# Patient Record
Sex: FEMALE | Race: WHITE | NOT HISPANIC OR LATINO | Employment: FULL TIME | URBAN - METROPOLITAN AREA
[De-identification: names, ages, dates, MRNs, and addresses within clinical notes are randomized per-mention and may not be internally consistent; named-entity substitution may affect disease eponyms.]

---

## 2023-08-28 LAB
ALANINE AMINOTRANSFERASE (SGPT) (U/L) IN SER/PLAS: 19 U/L (ref 7–45)
ALBUMIN (G/DL) IN SER/PLAS: 4.6 G/DL (ref 3.4–5)
ALKALINE PHOSPHATASE (U/L) IN SER/PLAS: 63 U/L (ref 33–110)
ANION GAP IN SER/PLAS: 14 MMOL/L (ref 10–20)
APPEARANCE, URINE: CLEAR
ASPARTATE AMINOTRANSFERASE (SGOT) (U/L) IN SER/PLAS: 26 U/L (ref 9–39)
BILIRUBIN TOTAL (MG/DL) IN SER/PLAS: 0.6 MG/DL (ref 0–1.2)
BILIRUBIN, URINE: NEGATIVE
BLOOD, URINE: NEGATIVE
CALCIUM (MG/DL) IN SER/PLAS: 9.9 MG/DL (ref 8.6–10.6)
CARBON DIOXIDE, TOTAL (MMOL/L) IN SER/PLAS: 27 MMOL/L (ref 21–32)
CHLORIDE (MMOL/L) IN SER/PLAS: 101 MMOL/L (ref 98–107)
CHOLESTEROL (MG/DL) IN SER/PLAS: 237 MG/DL (ref 0–199)
CHOLESTEROL IN HDL (MG/DL) IN SER/PLAS: 76.6 MG/DL
CHOLESTEROL/HDL RATIO: 3.1
COLOR, URINE: YELLOW
CREATININE (MG/DL) IN SER/PLAS: 1.01 MG/DL (ref 0.5–1.05)
ERYTHROCYTE DISTRIBUTION WIDTH (RATIO) BY AUTOMATED COUNT: 12.7 % (ref 11.5–14.5)
ERYTHROCYTE MEAN CORPUSCULAR HEMOGLOBIN CONCENTRATION (G/DL) BY AUTOMATED: 34 G/DL (ref 32–36)
ERYTHROCYTE MEAN CORPUSCULAR VOLUME (FL) BY AUTOMATED COUNT: 96 FL (ref 80–100)
ERYTHROCYTES (10*6/UL) IN BLOOD BY AUTOMATED COUNT: 4.4 X10E12/L (ref 4–5.2)
ESTIMATED AVERAGE GLUCOSE FOR HBA1C: 80 MG/DL
GFR FEMALE: 64 ML/MIN/1.73M2
GLUCOSE (MG/DL) IN SER/PLAS: 89 MG/DL (ref 74–99)
GLUCOSE, URINE: NEGATIVE MG/DL
HEMATOCRIT (%) IN BLOOD BY AUTOMATED COUNT: 42.3 % (ref 36–46)
HEMOGLOBIN (G/DL) IN BLOOD: 14.4 G/DL (ref 12–16)
HEMOGLOBIN A1C/HEMOGLOBIN TOTAL IN BLOOD: 4.4 %
KETONES, URINE: NEGATIVE MG/DL
LDL: 136 MG/DL (ref 0–99)
LEUKOCYTE ESTERASE, URINE: NEGATIVE
LEUKOCYTES (10*3/UL) IN BLOOD BY AUTOMATED COUNT: 6.4 X10E9/L (ref 4.4–11.3)
NITRITE, URINE: NEGATIVE
NRBC (PER 100 WBCS) BY AUTOMATED COUNT: 0 /100 WBC (ref 0–0)
PH, URINE: 6 (ref 5–8)
PLATELETS (10*3/UL) IN BLOOD AUTOMATED COUNT: 241 X10E9/L (ref 150–450)
POTASSIUM (MMOL/L) IN SER/PLAS: 4.8 MMOL/L (ref 3.5–5.3)
PROTEIN TOTAL: 7.1 G/DL (ref 6.4–8.2)
PROTEIN, URINE: NEGATIVE MG/DL
SODIUM (MMOL/L) IN SER/PLAS: 137 MMOL/L (ref 136–145)
SPECIFIC GRAVITY, URINE: 1.01 (ref 1–1.03)
THYROTROPIN (MIU/L) IN SER/PLAS BY DETECTION LIMIT <= 0.05 MIU/L: 78.17 MIU/L (ref 0.44–3.98)
THYROXINE (T4) FREE (NG/DL) IN SER/PLAS: 0.92 NG/DL (ref 0.78–1.48)
TRIGLYCERIDE (MG/DL) IN SER/PLAS: 120 MG/DL (ref 0–149)
UREA NITROGEN (MG/DL) IN SER/PLAS: 26 MG/DL (ref 6–23)
UROBILINOGEN, URINE: 4 MG/DL (ref 0–1.9)
VLDL: 24 MG/DL (ref 0–40)

## 2023-11-30 DIAGNOSIS — G89.29 CHRONIC LOW BACK PAIN, UNSPECIFIED BACK PAIN LATERALITY, UNSPECIFIED WHETHER SCIATICA PRESENT: Primary | ICD-10-CM

## 2023-11-30 DIAGNOSIS — M54.50 CHRONIC LOW BACK PAIN, UNSPECIFIED BACK PAIN LATERALITY, UNSPECIFIED WHETHER SCIATICA PRESENT: Primary | ICD-10-CM

## 2023-11-30 PROBLEM — N63.10 BREAST MASS, RIGHT: Status: ACTIVE | Noted: 2023-11-30

## 2023-11-30 PROBLEM — E03.9 HYPOTHYROIDISM: Status: ACTIVE | Noted: 2023-11-30

## 2023-11-30 PROBLEM — R05.9 COUGH: Status: ACTIVE | Noted: 2023-11-30

## 2023-11-30 PROBLEM — J18.9 PNEUMONIA: Status: RESOLVED | Noted: 2023-11-30 | Resolved: 2023-11-30

## 2023-11-30 PROBLEM — R10.13 ABDOMINAL PAIN, EPIGASTRIC: Status: RESOLVED | Noted: 2023-11-30 | Resolved: 2023-11-30

## 2023-11-30 PROBLEM — K59.00 CONSTIPATION: Status: ACTIVE | Noted: 2023-11-30

## 2023-11-30 PROBLEM — R10.9 ABDOMINAL PAIN: Status: RESOLVED | Noted: 2023-11-30 | Resolved: 2023-11-30

## 2023-11-30 PROBLEM — D72.819 LEUKOPENIA: Status: ACTIVE | Noted: 2023-11-30

## 2023-11-30 PROBLEM — F90.2 ADHD (ATTENTION DEFICIT HYPERACTIVITY DISORDER), COMBINED TYPE: Status: ACTIVE | Noted: 2023-11-30

## 2023-11-30 PROBLEM — L82.0 INFLAMED SEBORRHEIC KERATOSIS: Status: ACTIVE | Noted: 2022-05-05

## 2023-11-30 PROBLEM — K62.3 RECTAL PROLAPSE: Status: ACTIVE | Noted: 2023-11-30

## 2023-11-30 PROBLEM — R06.00 DYSPNEA: Status: ACTIVE | Noted: 2023-11-30

## 2023-11-30 PROBLEM — M54.2 NECK PAIN: Status: ACTIVE | Noted: 2023-11-30

## 2023-11-30 PROBLEM — F41.8 DEPRESSION WITH ANXIETY: Status: ACTIVE | Noted: 2023-11-30

## 2023-11-30 PROBLEM — E78.5 HYPERLIPEMIA: Status: ACTIVE | Noted: 2023-11-30

## 2023-11-30 PROBLEM — C44.519 BASAL CELL CARCINOMA OF SKIN OF OTHER PART OF TRUNK: Status: ACTIVE | Noted: 2022-05-05

## 2023-11-30 PROBLEM — K62.5 RECTAL BLEEDING: Status: RESOLVED | Noted: 2023-11-30 | Resolved: 2023-11-30

## 2023-11-30 PROBLEM — L57.0 ACTINIC KERATOSIS: Status: ACTIVE | Noted: 2022-05-05

## 2023-11-30 PROBLEM — K21.9 GERD (GASTROESOPHAGEAL REFLUX DISEASE): Status: ACTIVE | Noted: 2023-11-30

## 2023-11-30 PROBLEM — I95.1 DELAYED ORTHOSTATIC HYPOTENSION: Status: ACTIVE | Noted: 2023-11-30

## 2023-11-30 PROBLEM — N39.0 UTI (URINARY TRACT INFECTION): Status: RESOLVED | Noted: 2023-11-30 | Resolved: 2023-11-30

## 2023-11-30 PROBLEM — S20.219A RIB CONTUSION: Status: RESOLVED | Noted: 2023-11-30 | Resolved: 2023-11-30

## 2023-11-30 PROBLEM — K62.89 RECTAL PAIN: Status: RESOLVED | Noted: 2023-11-30 | Resolved: 2023-11-30

## 2023-11-30 PROBLEM — R92.8 ABNORMAL MAMMOGRAM: Status: ACTIVE | Noted: 2023-11-30

## 2023-11-30 RX ORDER — ETODOLAC 500 MG/1
500 TABLET, FILM COATED ORAL 2 TIMES DAILY
Qty: 180 TABLET | Refills: 0 | Status: SHIPPED | OUTPATIENT
Start: 2023-11-30 | End: 2024-02-28

## 2023-11-30 RX ORDER — TRAZODONE HYDROCHLORIDE 50 MG/1
TABLET ORAL
COMMUNITY
Start: 2014-02-24

## 2023-11-30 RX ORDER — ONDANSETRON 8 MG/1
TABLET, ORALLY DISINTEGRATING ORAL
COMMUNITY
Start: 2023-03-03

## 2023-11-30 RX ORDER — FLUOXETINE HYDROCHLORIDE 40 MG/1
2 CAPSULE ORAL DAILY
COMMUNITY
Start: 2014-02-17

## 2023-11-30 RX ORDER — PANTOPRAZOLE SODIUM 40 MG/1
TABLET, DELAYED RELEASE ORAL
COMMUNITY
Start: 2019-09-20 | End: 2024-03-11 | Stop reason: SDUPTHER

## 2023-11-30 RX ORDER — DEXTROAMPHETAMINE SULFATE, DEXTROAMPHETAMINE SACCHARATE, AMPHETAMINE ASPARTATE MONOHYDRATE, AND AMPHETAMINE SULFATE 12.5; 12.5; 12.5; 12.5 MG/1; MG/1; MG/1; MG/1
CAPSULE, EXTENDED RELEASE ORAL
COMMUNITY
Start: 2018-04-24

## 2023-11-30 RX ORDER — LEVOTHYROXINE SODIUM 125 UG/1
1 TABLET ORAL DAILY
COMMUNITY
Start: 2013-11-20

## 2023-11-30 RX ORDER — ATORVASTATIN CALCIUM 40 MG/1
1 TABLET, FILM COATED ORAL DAILY
COMMUNITY
Start: 2019-08-21 | End: 2024-01-30

## 2023-11-30 RX ORDER — LINACLOTIDE 145 UG/1
CAPSULE, GELATIN COATED ORAL
COMMUNITY
Start: 2019-09-20 | End: 2024-03-11 | Stop reason: SDUPTHER

## 2023-11-30 RX ORDER — ETODOLAC 500 MG/1
1 TABLET, FILM COATED ORAL 2 TIMES DAILY
COMMUNITY
Start: 2014-11-11 | End: 2023-11-30 | Stop reason: SDUPTHER

## 2024-01-03 ENCOUNTER — APPOINTMENT (OUTPATIENT)
Dept: RHEUMATOLOGY | Facility: CLINIC | Age: 59
End: 2024-01-03
Payer: COMMERCIAL

## 2024-02-20 ENCOUNTER — PROCEDURE VISIT (OUTPATIENT)
Dept: DERMATOLOGY | Facility: CLINIC | Age: 59
End: 2024-02-20
Payer: COMMERCIAL

## 2024-02-20 DIAGNOSIS — L20.89 OTHER ATOPIC DERMATITIS: Primary | ICD-10-CM

## 2024-02-20 DIAGNOSIS — D17.22 LIPOMA OF LEFT UPPER EXTREMITY: ICD-10-CM

## 2024-02-20 PROCEDURE — 13121 CMPLX RPR S/A/L 2.6-7.5 CM: CPT | Performed by: SPECIALIST

## 2024-02-20 PROCEDURE — 88304 TISSUE EXAM BY PATHOLOGIST: CPT | Performed by: DERMATOLOGY

## 2024-02-20 PROCEDURE — 11406 EXC TR-EXT B9+MARG >4.0 CM: CPT | Performed by: SPECIALIST

## 2024-02-20 RX ORDER — MUPIROCIN 20 MG/G
OINTMENT TOPICAL
Qty: 15 G | Refills: 0 | Status: SHIPPED | OUTPATIENT
Start: 2024-02-20

## 2024-02-20 RX ORDER — MOMETASONE FUROATE 1 MG/G
CREAM TOPICAL
Qty: 45 G | Refills: 0 | Status: SHIPPED | OUTPATIENT
Start: 2024-02-20

## 2024-02-20 RX ORDER — DOXYCYCLINE 100 MG/1
CAPSULE ORAL
Qty: 8 CAPSULE | Refills: 0 | Status: SHIPPED | OUTPATIENT
Start: 2024-02-20

## 2024-02-20 NOTE — PROGRESS NOTES
Subjective   HPI: Yoon Vazquez is a 58 y.o. female is here for evaluation and treatment of a painful enlarging bump on my left shoulder..     ROS: No other skin or systemic complaints other than what is documented elsewhere in the note.    ALLERGIES: Patient has no known allergies.    SOCIAL:  has no history on file for tobacco use, alcohol use, and drug use.    Objective     Description: This is a somewhat painful and enlarging large palpable soft subcutaneous mass involving her left shoulder.    Assessment/Plan      Plan: Sterile surgical excision.    FOLLOW UP: 10 days for suture removal.    The patient was encouraged to contact me with any further questions or concerns.  Randolph Lozano MD  2/20/2024

## 2024-02-20 NOTE — LETTER
February 20, 2024     Patient: Yoon Vazquez   YOB: 1965   Date of Visit: 2/20/2024       To Whom It May Concern:    It is my medical opinion that Yoon Vazquez may return to work on 2/27/24  with light duty of no heavy lifting or straining for another 7 days after.    If you have any questions or concerns, please don't hesitate to call.         Sincerely,        Randolph Lozano MD    CC: No Recipients

## 2024-02-20 NOTE — PROGRESS NOTES
Subjective     Yoon Vazquez is a 58 y.o. female who presents for the following: Sterile Surgery (Lipoma - Left Shoulder ).     Review of Systems:  No other skin or systemic complaints other than what is documented elsewhere in the note.    The following portions of the chart were reviewed this encounter and updated as appropriate:          Skin Cancer History  No skin cancer on file.      Specialty Problems          Dermatology Problems    Actinic keratosis    Basal cell carcinoma of skin of other part of trunk    Inflamed seborrheic keratosis        Objective   Well appearing patient in no apparent distress; mood and affect are within normal limits.    A focused skin examination was performed. All findings within normal limits unless otherwise noted below.    Assessment/Plan   1. Other atopic dermatitis    Related Medications  mometasone (Elocon) 0.1 % cream  Apply topically once daily to face.    2. Lipoma of neck  Neck - Posterior    Skin excision - Neck - Posterior    Informed consent: discussed and consent obtained    Timeout: patient name, date of birth, surgical site, and procedure verified    Procedure prep:  Patient prepped in sterile fashion  Anesthesia: the lesion was anesthetized in a standard fashion    Anesthetic:  1% lidocaine w/ epinephrine 1-100,000 local infiltration  Instrument used: #15 blade    Punch size:  4mm  Hemostasis achieved with: suture and pressure    Outcome: patient tolerated procedure well with no complications    Post-procedure details: sterile dressing applied and wound care instructions given    Dressing type: bandage      Specimen 1 - Dermatopathology- DERM LAB  Differential Diagnosis: Lipoma   Check Margins Yes/No?:    Comments:    Dermpath Lab: Routine Histopathology (formalin-fixed tissue)    Related Medications  doxycycline (Vibramycin) 100 mg capsule  Take 2 capsules PO today and then 1 capsule PO every day x6 days. Take with at least 8 ounces (large glass) of water, do not  lie down for 30 minutes after.    mupirocin (Bactroban) 2 % ointment  Apply a thin layer to biopsied three times a day x7 days

## 2024-02-22 LAB
LABORATORY COMMENT REPORT: NORMAL
PATH REPORT.FINAL DX SPEC: NORMAL
PATH REPORT.GROSS SPEC: NORMAL
PATH REPORT.MICROSCOPIC SPEC OTHER STN: NORMAL
PATH REPORT.RELEVANT HX SPEC: NORMAL
PATH REPORT.TOTAL CANCER: NORMAL

## 2024-03-04 ENCOUNTER — OFFICE VISIT (OUTPATIENT)
Dept: DERMATOLOGY | Facility: CLINIC | Age: 59
End: 2024-03-04
Payer: COMMERCIAL

## 2024-03-04 DIAGNOSIS — Z48.02 VISIT FOR SUTURE REMOVAL: ICD-10-CM

## 2024-03-04 DIAGNOSIS — L70.0 CYSTIC ACNE: Primary | ICD-10-CM

## 2024-03-04 PROCEDURE — 11900 INJECT SKIN LESIONS </W 7: CPT

## 2024-03-04 PROCEDURE — 99212 OFFICE O/P EST SF 10 MIN: CPT

## 2024-03-04 NOTE — PROGRESS NOTES
Subjective   HPI: Yoon Vazquez is a 59 y.o. female is here for suture removal and biopsy results from 2/20/2024.  Patient also complains of a painful cystic lesion on the left posterior scalp that has been present for couple of days and is throbbing.    ROS: No other skin or systemic complaints other than what is documented elsewhere in the note.    ALLERGIES: Patient has no known allergies.    SOCIAL:  has no history on file for tobacco use, alcohol use, and drug use.    Objective   Neck - Posterior  Severely inflamed cystic acne lesion on left posterior scalp    Left Shoulder - Anterior  The area has healed nicely.             Assessment/Plan   1. Cystic acne  Neck - Posterior    Patient opts for intralesional kenalog to help calm the inflammation.    Related Medications  triamcinolone acetonide (Kenalog) injection 10 mg      2. Visit for suture removal  Left Shoulder - Anterior    Discussed biopsy results from 2/20/2024 Derm lab [id number] which showed a lipoma.  Steri-Strips were placed on the wound.  Discussed with patient to allow these to naturally fall off and pick them off.         FOLLOW UP: As needed    The patient was encouraged to contact me with any further questions or concerns.  Cheyenne Ta PA-C  3/4/2024

## 2024-03-11 DIAGNOSIS — K21.9 GASTROESOPHAGEAL REFLUX DISEASE, UNSPECIFIED WHETHER ESOPHAGITIS PRESENT: Primary | ICD-10-CM

## 2024-03-11 DIAGNOSIS — K59.00 CONSTIPATION, UNSPECIFIED CONSTIPATION TYPE: ICD-10-CM

## 2024-03-11 RX ORDER — PANTOPRAZOLE SODIUM 40 MG/1
40 TABLET, DELAYED RELEASE ORAL
Qty: 30 TABLET | Refills: 0 | Status: SHIPPED | OUTPATIENT
Start: 2024-03-11 | End: 2024-03-12

## 2024-03-11 RX ORDER — LINACLOTIDE 145 UG/1
145 CAPSULE, GELATIN COATED ORAL
Qty: 30 CAPSULE | Refills: 0 | Status: SHIPPED | OUTPATIENT
Start: 2024-03-11 | End: 2024-04-09

## 2024-04-09 DIAGNOSIS — K59.00 CONSTIPATION, UNSPECIFIED CONSTIPATION TYPE: ICD-10-CM

## 2024-04-09 RX ORDER — LINACLOTIDE 145 UG/1
CAPSULE, GELATIN COATED ORAL
Qty: 30 CAPSULE | Refills: 0 | Status: SHIPPED | OUTPATIENT
Start: 2024-04-09 | End: 2024-05-10 | Stop reason: SDUPTHER

## 2024-04-24 ENCOUNTER — OFFICE VISIT (OUTPATIENT)
Dept: UROLOGY | Facility: CLINIC | Age: 59
End: 2024-04-24
Payer: COMMERCIAL

## 2024-04-24 VITALS — WEIGHT: 140 LBS | HEIGHT: 62 IN | BODY MASS INDEX: 25.76 KG/M2 | TEMPERATURE: 97.5 F

## 2024-04-24 DIAGNOSIS — R31.0 GROSS HEMATURIA: Primary | ICD-10-CM

## 2024-04-24 LAB
POC APPEARANCE, URINE: CLEAR
POC BILIRUBIN, URINE: ABNORMAL
POC BLOOD, URINE: ABNORMAL
POC COLOR, URINE: YELLOW
POC GLUCOSE, URINE: NEGATIVE MG/DL
POC KETONES, URINE: ABNORMAL MG/DL
POC LEUKOCYTES, URINE: NEGATIVE
POC NITRITE,URINE: NEGATIVE
POC PH, URINE: 5.5 PH
POC PROTEIN, URINE: ABNORMAL MG/DL
POC SPECIFIC GRAVITY, URINE: >=1.03
POC UROBILINOGEN, URINE: 1 EU/DL

## 2024-04-24 PROCEDURE — 88112 CYTOPATH CELL ENHANCE TECH: CPT

## 2024-04-24 PROCEDURE — 99204 OFFICE O/P NEW MOD 45 MIN: CPT | Performed by: PHYSICIAN ASSISTANT

## 2024-04-24 PROCEDURE — 81002 URINALYSIS NONAUTO W/O SCOPE: CPT | Performed by: PHYSICIAN ASSISTANT

## 2024-04-24 PROCEDURE — 88112 CYTOPATH CELL ENHANCE TECH: CPT | Performed by: PATHOLOGY

## 2024-04-24 PROCEDURE — 51798 US URINE CAPACITY MEASURE: CPT | Performed by: PHYSICIAN ASSISTANT

## 2024-04-24 PROCEDURE — 1036F TOBACCO NON-USER: CPT | Performed by: PHYSICIAN ASSISTANT

## 2024-04-24 PROCEDURE — 81001 URINALYSIS AUTO W/SCOPE: CPT

## 2024-04-24 PROCEDURE — 87086 URINE CULTURE/COLONY COUNT: CPT

## 2024-04-24 ASSESSMENT — ENCOUNTER SYMPTOMS
NEUROLOGICAL NEGATIVE: 1
HEMATOLOGIC/LYMPHATIC NEGATIVE: 1
RESPIRATORY NEGATIVE: 1
CONSTITUTIONAL NEGATIVE: 1
MUSCULOSKELETAL NEGATIVE: 1
ALLERGIC/IMMUNOLOGIC NEGATIVE: 1
PSYCHIATRIC NEGATIVE: 1
GASTROINTESTINAL NEGATIVE: 1
HEMATURIA: 1
EYES NEGATIVE: 1
CARDIOVASCULAR NEGATIVE: 1
ENDOCRINE NEGATIVE: 1

## 2024-04-24 ASSESSMENT — PAIN SCALES - GENERAL: PAINLEVEL: 0-NO PAIN

## 2024-04-24 NOTE — PROGRESS NOTES
Subjective   Patient ID: Yoon Vazquez is a 59 y.o. female who presents for New Patient Visit.  HPI  Patient is a 59-year-old female kindly referred by Dr. George for evaluation of gross hematuria.    Last week patient reports 2 episodes of painless gross hematuria.  She denies dysuria, increased urinary frequency or urgency, flank pain.  She reports nausea which has been going on for about 6 months.    Patient denies history of smoking.  She denies personal history of kidney stones.  She denies family history of  malignancies.    She reports her brother has skin cancer.  Aunt has breast cancer     UA large blod  PVR minimal    Review of Systems   Constitutional: Negative.    HENT: Negative.     Eyes: Negative.    Respiratory: Negative.     Cardiovascular: Negative.    Gastrointestinal: Negative.    Endocrine: Negative.    Genitourinary:  Positive for hematuria.   Musculoskeletal: Negative.    Skin: Negative.    Allergic/Immunologic: Negative.    Neurological: Negative.    Hematological: Negative.    Psychiatric/Behavioral: Negative.         Objective   Physical Exam  Constitutional:       General: She is not in acute distress.     Appearance: Normal appearance.   HENT:      Head: Normocephalic and atraumatic.      Nose: Nose normal.      Mouth/Throat:      Mouth: Mucous membranes are dry.   Cardiovascular:      Rate and Rhythm: Normal rate.   Pulmonary:      Effort: Pulmonary effort is normal.   Abdominal:      General: Abdomen is flat.      Palpations: Abdomen is soft.   Musculoskeletal:         General: Normal range of motion.      Cervical back: Normal range of motion and neck supple.   Skin:     General: Skin is warm and dry.   Neurological:      General: No focal deficit present.      Mental Status: She is alert and oriented to person, place, and time.   Psychiatric:         Mood and Affect: Mood normal.         Assessment/Plan            Deep Sanchez PA-C 04/24/24 2:24 PM

## 2024-04-24 NOTE — ASSESSMENT & PLAN NOTE
I discussed reasons of hematuria including renal, ureteral or bladder stones, urinary tract infection, or  malignancies.  I discussed full evaluation of microscopic hematuria including CT urogram and cystoscopy.    I discussed cystoscopy techniques and possible complications and she is agreeable with the plan

## 2024-04-25 LAB
APPEARANCE UR: ABNORMAL
BILIRUB UR STRIP.AUTO-MCNC: ABNORMAL MG/DL
COLOR UR: ABNORMAL
GLUCOSE UR STRIP.AUTO-MCNC: NORMAL MG/DL
KETONES UR STRIP.AUTO-MCNC: NEGATIVE MG/DL
LEUKOCYTE ESTERASE UR QL STRIP.AUTO: NEGATIVE
MUCOUS THREADS #/AREA URNS AUTO: ABNORMAL /LPF
NITRITE UR QL STRIP.AUTO: NEGATIVE
PH UR STRIP.AUTO: 6 [PH]
PROT UR STRIP.AUTO-MCNC: ABNORMAL MG/DL
RBC # UR STRIP.AUTO: ABNORMAL /UL
RBC #/AREA URNS AUTO: >20 /HPF
SP GR UR STRIP.AUTO: 1.03
SQUAMOUS #/AREA URNS AUTO: ABNORMAL /HPF
UROBILINOGEN UR STRIP.AUTO-MCNC: ABNORMAL MG/DL
WBC #/AREA URNS AUTO: ABNORMAL /HPF

## 2024-04-26 LAB — BACTERIA UR CULT: NORMAL

## 2024-04-29 LAB
LABORATORY COMMENT REPORT: NORMAL
LABORATORY COMMENT REPORT: NORMAL
PATH REPORT.FINAL DX SPEC: NORMAL
PATH REPORT.GROSS SPEC: NORMAL
PATH REPORT.RELEVANT HX SPEC: NORMAL
PATH REPORT.TOTAL CANCER: NORMAL

## 2024-05-01 ENCOUNTER — APPOINTMENT (OUTPATIENT)
Dept: LAB | Facility: LAB | Age: 59
End: 2024-05-01
Payer: COMMERCIAL

## 2024-05-01 ENCOUNTER — HOSPITAL ENCOUNTER (OUTPATIENT)
Dept: RADIOLOGY | Facility: HOSPITAL | Age: 59
Discharge: HOME | End: 2024-05-01
Payer: COMMERCIAL

## 2024-05-01 DIAGNOSIS — R31.0 GROSS HEMATURIA: ICD-10-CM

## 2024-05-01 LAB
ANION GAP SERPL CALC-SCNC: 14 MMOL/L (ref 10–20)
BUN SERPL-MCNC: 17 MG/DL (ref 6–23)
CALCIUM SERPL-MCNC: 9.3 MG/DL (ref 8.6–10.3)
CHLORIDE SERPL-SCNC: 101 MMOL/L (ref 98–107)
CO2 SERPL-SCNC: 24 MMOL/L (ref 21–32)
CREAT SERPL-MCNC: 1.16 MG/DL (ref 0.5–1.05)
EGFRCR SERPLBLD CKD-EPI 2021: 54 ML/MIN/1.73M*2
GLUCOSE SERPL-MCNC: 105 MG/DL (ref 74–99)
POTASSIUM SERPL-SCNC: 3.6 MMOL/L (ref 3.5–5.3)
SODIUM SERPL-SCNC: 135 MMOL/L (ref 136–145)

## 2024-05-01 PROCEDURE — 76377 3D RENDER W/INTRP POSTPROCES: CPT | Performed by: RADIOLOGY

## 2024-05-01 PROCEDURE — 2550000001 HC RX 255 CONTRASTS: Performed by: PHYSICIAN ASSISTANT

## 2024-05-01 PROCEDURE — 74178 CT ABD&PLV WO CNTR FLWD CNTR: CPT | Performed by: RADIOLOGY

## 2024-05-01 PROCEDURE — 76377 3D RENDER W/INTRP POSTPROCES: CPT

## 2024-05-01 PROCEDURE — 36415 COLL VENOUS BLD VENIPUNCTURE: CPT

## 2024-05-01 PROCEDURE — 80048 BASIC METABOLIC PNL TOTAL CA: CPT

## 2024-05-01 RX ADMIN — IOHEXOL 100 ML: 350 INJECTION, SOLUTION INTRAVENOUS at 10:41

## 2024-05-10 DIAGNOSIS — K59.00 CONSTIPATION, UNSPECIFIED CONSTIPATION TYPE: ICD-10-CM

## 2024-05-10 DIAGNOSIS — E78.5 HYPERLIPIDEMIA, UNSPECIFIED HYPERLIPIDEMIA TYPE: ICD-10-CM

## 2024-05-10 RX ORDER — LINACLOTIDE 145 UG/1
CAPSULE, GELATIN COATED ORAL
Qty: 90 CAPSULE | Refills: 0 | Status: SHIPPED | OUTPATIENT
Start: 2024-05-10

## 2024-05-10 RX ORDER — ATORVASTATIN CALCIUM 40 MG/1
40 TABLET, FILM COATED ORAL NIGHTLY
Qty: 90 TABLET | Refills: 0 | Status: SHIPPED | OUTPATIENT
Start: 2024-05-10

## 2024-05-30 ENCOUNTER — PROCEDURE VISIT (OUTPATIENT)
Dept: UROLOGY | Facility: CLINIC | Age: 59
End: 2024-05-30
Payer: COMMERCIAL

## 2024-05-30 VITALS
TEMPERATURE: 94.8 F | DIASTOLIC BLOOD PRESSURE: 90 MMHG | SYSTOLIC BLOOD PRESSURE: 154 MMHG | WEIGHT: 140 LBS | HEIGHT: 62 IN | HEART RATE: 67 BPM | BODY MASS INDEX: 25.76 KG/M2

## 2024-05-30 DIAGNOSIS — R31.0 GROSS HEMATURIA: Primary | ICD-10-CM

## 2024-05-30 LAB
POC APPEARANCE, URINE: CLEAR
POC BILIRUBIN, URINE: ABNORMAL
POC BLOOD, URINE: NEGATIVE
POC COLOR, URINE: YELLOW
POC GLUCOSE, URINE: NEGATIVE MG/DL
POC KETONES, URINE: ABNORMAL MG/DL
POC LEUKOCYTES, URINE: NEGATIVE
POC NITRITE,URINE: NEGATIVE
POC PH, URINE: 5.5 PH
POC PROTEIN, URINE: NEGATIVE MG/DL
POC SPECIFIC GRAVITY, URINE: 1.02
POC UROBILINOGEN, URINE: 0.2 EU/DL

## 2024-05-30 PROCEDURE — 99214 OFFICE O/P EST MOD 30 MIN: CPT | Performed by: STUDENT IN AN ORGANIZED HEALTH CARE EDUCATION/TRAINING PROGRAM

## 2024-05-30 PROCEDURE — 52000 CYSTOURETHROSCOPY: CPT | Performed by: STUDENT IN AN ORGANIZED HEALTH CARE EDUCATION/TRAINING PROGRAM

## 2024-05-30 PROCEDURE — 88112 CYTOPATH CELL ENHANCE TECH: CPT

## 2024-05-30 PROCEDURE — 81002 URINALYSIS NONAUTO W/O SCOPE: CPT | Performed by: STUDENT IN AN ORGANIZED HEALTH CARE EDUCATION/TRAINING PROGRAM

## 2024-05-30 ASSESSMENT — PAIN SCALES - GENERAL: PAINLEVEL: 0-NO PAIN

## 2024-05-30 NOTE — PROGRESS NOTES
HPI:    Yoon Vazquez is a 59 y.o. female referred by Deep Sanchez for gross hematuria. Hx of GERD, HLD, breast mass. Cre 1.16 (5/1/24). CT urogram (5/1/24) showed suboptimal evaluation of the mid to distal left ureter as well as the distal right ureter due to poor contrast opacification, no abnormal ureteral thickening or filling defects bilaterally, no duplication of the collecting systems, punctate bilateral nonobstructing renal stones, no ureteral stones or hydroureteronephrosis, no suspicious renal lesions, no abnormal wall thickening or soft tissue lesions identified involving the bladder. Presents for cystoscopy. Reports consistent hematuria for the past few weeks.     Non-smoker     Cre: 1.16 (5/1/24)    CT urogram (5/1/24): suboptimal evaluation of the mid to distal left ureter as well as the distal right ureter due to poor contrast opacification, no abnormal ureteral thickening or filling defects bilaterally, no duplication of the collecting systems, punctate bilateral nonobstructing renal stones, no ureteral stones or hydroureteronephrosis, no suspicious renal lesions, no abnormal wall thickening or soft tissue lesions identified involving the bladder.    Review of Systems:  All systems reviewed. Anything negative noted in the HPI.    Physical Exam:  Vitals signs reviewed.  Constitutional:      Appearance: Well-developed.  HENT:     Head: Normocephalic and atraumatic.  Neck:     Musculoskeletal: Normal range of motion.  Pulmonary:     Effort: Pulmonary effort is normal.  Musculoskeletal: Normal range of motion.  Skin:     General: Skin is warm and dry.  Neurological:     Mental Status: Alert and oriented to person, place, and time.  Psychiatric:        Behavior: Behavior normal.        Thought Content: Thought content normal.        Judgment: Judgment normal.    Cystoscopy    Date/Time: 5/30/2024 12:10 PM    Performed by: Tino Riddle MD  Authorized by: Tino Riddle MD      Post-procedure:      Patient tolerance: Patient tolerated the procedure well with no immediate complications      Comments:      No mucosal lesions       Assessment/Plan   Yoon Vazquez is a 59 y.o. female referred by Deep Sanchez for gross hematuria. Hx of GERD, HLD, breast mass. Cre 1.16 (5/1/24). CT urogram (5/1/24) showed suboptimal evaluation of the mid to distal left ureter as well as the distal right ureter due to poor contrast opacification, no abnormal ureteral thickening or filling defects bilaterally, no duplication of the collecting systems, punctate bilateral nonobstructing renal stones, no ureteral stones or hydroureteronephrosis, no suspicious renal lesions, no abnormal wall thickening or soft tissue lesions identified involving the bladder. Reports consistent hematuria for the past few weeks. Management options including risks, benefits and alternatives discussed at length and all questions answered. Patient prefers to proceed with follow-up as needed.             Scribe Attestation:  By signing my name below, Cece PELAEZ Scribe   attest that this documentation has been prepared under the direction and in the presence of Tino Riddle MD.

## 2024-05-31 ENCOUNTER — OFFICE VISIT (OUTPATIENT)
Dept: DERMATOLOGY | Facility: CLINIC | Age: 59
End: 2024-05-31
Payer: COMMERCIAL

## 2024-05-31 DIAGNOSIS — L85.3 XEROSIS CUTIS: ICD-10-CM

## 2024-05-31 DIAGNOSIS — L90.5 SCAR CONDITIONS AND FIBROSIS OF SKIN: ICD-10-CM

## 2024-05-31 DIAGNOSIS — D22.9 MULTIPLE BENIGN NEVI: ICD-10-CM

## 2024-05-31 DIAGNOSIS — L57.0 ACTINIC KERATOSIS: Primary | ICD-10-CM

## 2024-05-31 DIAGNOSIS — L57.8 DIFFUSE PHOTOAGING OF SKIN: ICD-10-CM

## 2024-05-31 PROCEDURE — 99214 OFFICE O/P EST MOD 30 MIN: CPT

## 2024-05-31 PROCEDURE — 17003 DESTRUCT PREMALG LES 2-14: CPT

## 2024-05-31 PROCEDURE — 17000 DESTRUCT PREMALG LESION: CPT

## 2024-06-06 NOTE — PROGRESS NOTES
Subjective   HPI: Yoon Vazquez is a 59 y.o. female who presents in office for a full body skin examination.     ROS: No other skin or systemic complaints other than what is documented elsewhere in the note.    ALLERGIES: Patient has no known allergies.    SOCIAL:  reports that she has never smoked. She has never used smokeless tobacco. She reports that she does not currently use alcohol. She reports that she does not use drugs.      Objective   A chaperone was present during the entirety of the examination.    Well appearing patient in no apparent distress; mood and affect are within normal limits.    A full examination was performed including scalp, head, eyes, ears, nose, lips, mouth, tongue, neck, chest, axillae, abdomen, back, buttocks, bilateral upper extremities, bilateral lower extremities, hands, feet, fingers, toes, fingernails, and toenails. Cervical, supraclavicular, axillary and inguinal lymph nodes were palpated. All findings within normal limits unless otherwise noted below.    Spotty depigmentation and hyperpigmentation, and spotty hyperkeratosis with solar lentigo's in light exposed areas     Numerous benign appearing symmetrical, regular boarders, evenly pigmented, nevi    Bilateral Hands, Left Dorsal Hand, Right Dorsal Hand (4)  -Thin erythematous papules with gritty scale      Left Shoulder - Anterior  Well-healed scar        Assessment/Plan   1. Actinic keratosis (6)  Bilateral Hands; Left Dorsal Hand; Right Dorsal Hand (4)    Discussed precancerous nature of AK's with patient. I recommended cryotherapy for treatment today. I discussed possible side effects of cryotherapy with patient, including erythema, swelling, or blistering. Patient verbalizes understanding and opts for treatment today.    Discussed with patient that if the areas that were treated with LN2 today do not resolve within 4 weeks to please call my office for evaluation.    A total of 6 AK's were tx with cryotherapy  today.    Cryotherapy, skin lesion - Bilateral Hands, Left Dorsal Hand, Right Dorsal Hand (4)    2. Scar conditions and fibrosis of skin  Left Shoulder - Anterior    Well-healed scar.  No concerns.    3. Xerosis cutis    Dry skin is common, can be worsened by climate (dry climate makes worse), harsh soaps, and lack of moisturizing. It may worsen as we age. I recommend a gentle skin care regimen including washing with a mild soap without fragrance such as dove for sensitive skin, cetaphil or cerave. Pat dry after washing and then apply a thick moisturizer such as Cerave cream.    4. Diffuse photoaging of skin    Discussed the need for annual body examination. The patient was advised to practice sun protection and sun avoidance, use daily sunscreen, and perform regular self skin exams.  Sun protection was discussed, including avoiding the mid-day sun, wearing a sunscreen with SPF at least 60 that is broad spectrum and water resistant, and stressing the need for reapplication of sunscreen and applying more than they think they need.     5. Multiple benign nevi    Discussed the need for annual body examination. The patient was advised to practice sun protection and sun avoidance, use daily sunscreen, and perform regular self skin exams.  Sun protection was discussed, including avoiding the mid-day sun, wearing a sunscreen with SPF at least 60, and stressing the need for reapplication of sunscreen and applying more than they think they need.     Discussed the ABCDEs of melanoma and recommended patient observe moles for any changes.  Patient verbalizes understanding.         FOLLOW UP: 1 year full-body skin exam    The patient was encouraged to contact me with any further questions or concerns.  Cheyenne Ta PA-C  6/6/2024

## 2024-06-25 ENCOUNTER — APPOINTMENT (OUTPATIENT)
Dept: UROLOGY | Facility: CLINIC | Age: 59
End: 2024-06-25
Payer: COMMERCIAL

## 2024-06-28 ENCOUNTER — APPOINTMENT (OUTPATIENT)
Dept: DERMATOLOGY | Facility: CLINIC | Age: 59
End: 2024-06-28
Payer: COMMERCIAL

## 2024-06-28 DIAGNOSIS — L57.0 ACTINIC KERATOSIS: Primary | ICD-10-CM

## 2024-06-28 DIAGNOSIS — L71.9 ACNE ROSACEA: ICD-10-CM

## 2024-06-28 RX ORDER — MOMETASONE FUROATE 1 MG/G
CREAM TOPICAL
Qty: 45 G | Refills: 0 | Status: SHIPPED | OUTPATIENT
Start: 2024-06-28

## 2024-06-28 RX ORDER — KETOCONAZOLE 20 MG/G
CREAM TOPICAL
Qty: 60 G | Refills: 0 | Status: SHIPPED | OUTPATIENT
Start: 2024-06-28

## 2024-06-28 NOTE — PROGRESS NOTES
Subjective   HPI: Yoon Vazquez is a 59 y.o. female who presents in office for AK check of bilateral hands as well as a rash on the right cheek.    Dermatitis of right cheek  Present for about 2 weeks  Started off as pimple-like lesions which she then picked  Now the areas are pruritic  States that she does have a history of rosacea    ROS: No other skin or systemic complaints other than what is documented elsewhere in the note.    ALLERGIES: Patient has no known allergies.    SOCIAL:  reports that she has never smoked. She has never used smokeless tobacco. She reports that she does not currently use alcohol. She reports that she does not use drugs.    Objective   Head - Anterior (Face)  scattered inflammatory papules      Left Dorsal Hand, Right Dorsal Hand  -Thin erythematous papules with gritty scale          Assessment/Plan   1. Acne rosacea  Head - Anterior (Face)    Mometasone/ketoconazole applied daily. Recheck 4-6 weeks    Related Medications  mometasone (Elocon) 0.1 % cream  Apply topically once daily to affected area. Mix with ketoconazole cream.    ketoconazole (NIZOral) 2 % cream  Apply topically to affected area once daily. Mix with mometasone cream.    2. Actinic keratosis (2)  Left Dorsal Hand; Right Dorsal Hand    Discussed precancerous nature of AK's with patient. I recommended cryotherapy for treatment today. I discussed possible side effects of cryotherapy with patient, including erythema, swelling, or blistering. Patient verbalizes understanding and opts for treatment today.    Discussed with patient that if the areas that were treated with LN2 today do not resolve within 4 weeks to please call my office for evaluation.    A total of 2 AK's were tx with cryotherapy today.    Cryotherapy, skin lesion - Left Dorsal Hand, Right Dorsal Hand         FOLLOW UP: 4-6 weeks    The patient was encouraged to contact me with any further questions or concerns.  Cheyenne Ta PA-C  7/4/2024

## 2024-07-11 ENCOUNTER — HOSPITAL ENCOUNTER (EMERGENCY)
Facility: HOSPITAL | Age: 59
Discharge: HOME | End: 2024-07-11
Payer: COMMERCIAL

## 2024-07-11 ENCOUNTER — APPOINTMENT (OUTPATIENT)
Dept: RADIOLOGY | Facility: HOSPITAL | Age: 59
End: 2024-07-11
Payer: COMMERCIAL

## 2024-07-11 VITALS
DIASTOLIC BLOOD PRESSURE: 90 MMHG | SYSTOLIC BLOOD PRESSURE: 133 MMHG | RESPIRATION RATE: 18 BRPM | WEIGHT: 140 LBS | TEMPERATURE: 97.5 F | HEIGHT: 65 IN | HEART RATE: 68 BPM | OXYGEN SATURATION: 98 % | BODY MASS INDEX: 23.32 KG/M2

## 2024-07-11 DIAGNOSIS — M11.262 PSEUDOGOUT OF LEFT KNEE: Primary | ICD-10-CM

## 2024-07-11 PROBLEM — M75.41 IMPINGEMENT SYNDROME OF RIGHT SHOULDER: Status: ACTIVE | Noted: 2023-11-08

## 2024-07-11 LAB
ALBUMIN SERPL BCP-MCNC: 3.9 G/DL (ref 3.4–5)
ALP SERPL-CCNC: 63 U/L (ref 33–110)
ALT SERPL W P-5'-P-CCNC: 13 U/L (ref 7–45)
ANION GAP SERPL CALC-SCNC: 14 MMOL/L (ref 10–20)
AST SERPL W P-5'-P-CCNC: 16 U/L (ref 9–39)
BASOPHILS # BLD AUTO: 0.03 X10*3/UL (ref 0–0.1)
BASOPHILS NFR BLD AUTO: 0.3 %
BILIRUB SERPL-MCNC: 0.8 MG/DL (ref 0–1.2)
BUN SERPL-MCNC: 19 MG/DL (ref 6–23)
CALCIUM SERPL-MCNC: 9 MG/DL (ref 8.6–10.3)
CHLORIDE SERPL-SCNC: 104 MMOL/L (ref 98–107)
CO2 SERPL-SCNC: 24 MMOL/L (ref 21–32)
CREAT SERPL-MCNC: 0.97 MG/DL (ref 0.5–1.05)
CRP SERPL-MCNC: 0.91 MG/DL
EGFRCR SERPLBLD CKD-EPI 2021: 67 ML/MIN/1.73M*2
EOSINOPHIL # BLD AUTO: 0.23 X10*3/UL (ref 0–0.7)
EOSINOPHIL NFR BLD AUTO: 2.7 %
ERYTHROCYTE [DISTWIDTH] IN BLOOD BY AUTOMATED COUNT: 12.3 % (ref 11.5–14.5)
ERYTHROCYTE [SEDIMENTATION RATE] IN BLOOD BY WESTERGREN METHOD: 3 MM/H (ref 0–30)
GLUCOSE SERPL-MCNC: 94 MG/DL (ref 74–99)
HCT VFR BLD AUTO: 36.2 % (ref 36–46)
HGB BLD-MCNC: 12.4 G/DL (ref 12–16)
IMM GRANULOCYTES # BLD AUTO: 0.04 X10*3/UL (ref 0–0.7)
IMM GRANULOCYTES NFR BLD AUTO: 0.5 % (ref 0–0.9)
LYMPHOCYTES # BLD AUTO: 1.1 X10*3/UL (ref 1.2–4.8)
LYMPHOCYTES NFR BLD AUTO: 12.8 %
MCH RBC QN AUTO: 31.6 PG (ref 26–34)
MCHC RBC AUTO-ENTMCNC: 34.3 G/DL (ref 32–36)
MCV RBC AUTO: 92 FL (ref 80–100)
MONOCYTES # BLD AUTO: 0.98 X10*3/UL (ref 0.1–1)
MONOCYTES NFR BLD AUTO: 11.4 %
NEUTROPHILS # BLD AUTO: 6.2 X10*3/UL (ref 1.2–7.7)
NEUTROPHILS NFR BLD AUTO: 72.3 %
NRBC BLD-RTO: 0 /100 WBCS (ref 0–0)
PLATELET # BLD AUTO: 226 X10*3/UL (ref 150–450)
POTASSIUM SERPL-SCNC: 3.8 MMOL/L (ref 3.5–5.3)
PROT SERPL-MCNC: 6 G/DL (ref 6.4–8.2)
RBC # BLD AUTO: 3.93 X10*6/UL (ref 4–5.2)
SODIUM SERPL-SCNC: 138 MMOL/L (ref 136–145)
WBC # BLD AUTO: 8.6 X10*3/UL (ref 4.4–11.3)

## 2024-07-11 PROCEDURE — 85025 COMPLETE CBC W/AUTO DIFF WBC: CPT | Performed by: PHYSICIAN ASSISTANT

## 2024-07-11 PROCEDURE — 2500000001 HC RX 250 WO HCPCS SELF ADMINISTERED DRUGS (ALT 637 FOR MEDICARE OP): Performed by: PHYSICIAN ASSISTANT

## 2024-07-11 PROCEDURE — 84075 ASSAY ALKALINE PHOSPHATASE: CPT | Performed by: PHYSICIAN ASSISTANT

## 2024-07-11 PROCEDURE — 36415 COLL VENOUS BLD VENIPUNCTURE: CPT | Performed by: PHYSICIAN ASSISTANT

## 2024-07-11 PROCEDURE — 96374 THER/PROPH/DIAG INJ IV PUSH: CPT

## 2024-07-11 PROCEDURE — 99284 EMERGENCY DEPT VISIT MOD MDM: CPT | Mod: 25

## 2024-07-11 PROCEDURE — 96375 TX/PRO/DX INJ NEW DRUG ADDON: CPT

## 2024-07-11 PROCEDURE — 2500000004 HC RX 250 GENERAL PHARMACY W/ HCPCS (ALT 636 FOR OP/ED): Performed by: PHYSICIAN ASSISTANT

## 2024-07-11 PROCEDURE — 73564 X-RAY EXAM KNEE 4 OR MORE: CPT | Mod: LT

## 2024-07-11 PROCEDURE — 86140 C-REACTIVE PROTEIN: CPT | Performed by: PHYSICIAN ASSISTANT

## 2024-07-11 PROCEDURE — 73564 X-RAY EXAM KNEE 4 OR MORE: CPT | Mod: LEFT SIDE | Performed by: RADIOLOGY

## 2024-07-11 PROCEDURE — 85652 RBC SED RATE AUTOMATED: CPT | Performed by: PHYSICIAN ASSISTANT

## 2024-07-11 RX ORDER — PREDNISONE 20 MG/1
40 TABLET ORAL ONCE
Status: COMPLETED | OUTPATIENT
Start: 2024-07-11 | End: 2024-07-11

## 2024-07-11 RX ORDER — HYDROCODONE BITARTRATE AND ACETAMINOPHEN 5; 325 MG/1; MG/1
1 TABLET ORAL EVERY 6 HOURS PRN
Qty: 6 TABLET | Refills: 0 | Status: SHIPPED | OUTPATIENT
Start: 2024-07-11 | End: 2024-07-20 | Stop reason: HOSPADM

## 2024-07-11 RX ORDER — LORAZEPAM 1 MG/1
0.5 TABLET ORAL ONCE
Status: COMPLETED | OUTPATIENT
Start: 2024-07-11 | End: 2024-07-11

## 2024-07-11 RX ORDER — MORPHINE SULFATE 4 MG/ML
4 INJECTION, SOLUTION INTRAMUSCULAR; INTRAVENOUS ONCE
Status: COMPLETED | OUTPATIENT
Start: 2024-07-11 | End: 2024-07-11

## 2024-07-11 RX ORDER — ONDANSETRON HYDROCHLORIDE 2 MG/ML
4 INJECTION, SOLUTION INTRAVENOUS ONCE
Status: COMPLETED | OUTPATIENT
Start: 2024-07-11 | End: 2024-07-11

## 2024-07-11 RX ORDER — PREDNISONE 20 MG/1
20 TABLET ORAL DAILY
Qty: 5 TABLET | Refills: 0 | Status: SHIPPED | OUTPATIENT
Start: 2024-07-11 | End: 2024-07-20 | Stop reason: HOSPADM

## 2024-07-11 RX ADMIN — ONDANSETRON 4 MG: 2 INJECTION INTRAMUSCULAR; INTRAVENOUS at 07:41

## 2024-07-11 RX ADMIN — PREDNISONE 40 MG: 20 TABLET ORAL at 09:48

## 2024-07-11 RX ADMIN — MORPHINE SULFATE 4 MG: 4 INJECTION, SOLUTION INTRAMUSCULAR; INTRAVENOUS at 07:41

## 2024-07-11 RX ADMIN — LORAZEPAM 0.5 MG: 1 TABLET ORAL at 07:41

## 2024-07-11 ASSESSMENT — PAIN SCALES - GENERAL
PAINLEVEL_OUTOF10: 3
PAINLEVEL_OUTOF10: 10 - WORST POSSIBLE PAIN

## 2024-07-11 ASSESSMENT — PAIN DESCRIPTION - FREQUENCY: FREQUENCY: CONSTANT/CONTINUOUS

## 2024-07-11 ASSESSMENT — PAIN DESCRIPTION - PAIN TYPE
TYPE: ACUTE PAIN
TYPE: CHRONIC PAIN

## 2024-07-11 ASSESSMENT — PAIN DESCRIPTION - ORIENTATION: ORIENTATION: LEFT

## 2024-07-11 ASSESSMENT — PAIN DESCRIPTION - PROGRESSION: CLINICAL_PROGRESSION: RAPIDLY WORSENING

## 2024-07-11 ASSESSMENT — COLUMBIA-SUICIDE SEVERITY RATING SCALE - C-SSRS
2. HAVE YOU ACTUALLY HAD ANY THOUGHTS OF KILLING YOURSELF?: NO
1. IN THE PAST MONTH, HAVE YOU WISHED YOU WERE DEAD OR WISHED YOU COULD GO TO SLEEP AND NOT WAKE UP?: NO
6. HAVE YOU EVER DONE ANYTHING, STARTED TO DO ANYTHING, OR PREPARED TO DO ANYTHING TO END YOUR LIFE?: NO

## 2024-07-11 ASSESSMENT — PAIN DESCRIPTION - LOCATION: LOCATION: KNEE

## 2024-07-11 ASSESSMENT — PAIN - FUNCTIONAL ASSESSMENT
PAIN_FUNCTIONAL_ASSESSMENT: 0-10
PAIN_FUNCTIONAL_ASSESSMENT: 0-10

## 2024-07-11 ASSESSMENT — PAIN DESCRIPTION - ONSET: ONSET: GRADUAL

## 2024-07-11 NOTE — ED TRIAGE NOTES
"BIBA from home with chronic L knee pain. Patient states she woke up with severe pain this morning. Extremely anxious upon arrival, states she has \"pseudo gout\"  "

## 2024-07-11 NOTE — ED NOTES
IV access established 20G L-AC ,blood work obtained and sent to lab. Pt medicated per orders, XR at bedside.      Cheo Buchanan RN  07/11/24 5482

## 2024-07-11 NOTE — DISCHARGE INSTRUCTIONS
Please continue Tylenol and/or NSAIDs as needed for pain.  May take Norco as needed for breakthrough pain.  This is a narcotic.  Do not drive, drink alcohol or take other sedating medications while using this medicine.  Increase water and fiber intake to prevent constipation.  Practice extra caution for falls.  Begin daily prednisone for the next 5 days.  Follow-up with orthopedics or your primary care doctor.  Return to ER for any new or worsening symptoms.     orthopedic injury clinic   [facility] sports medicine [place]  [address]  [id number]  [place] [id number] [phone]  Open for walk-ins Monday through Friday 8:30 AM through 4 PM  open 10 AM to 2 PM Christmas eve and New Year's tierney  Closed on Cora day and New Year's day

## 2024-07-11 NOTE — ED PROVIDER NOTES
"Chief Complaint   Patient presents with    Knee Pain     HPI:   Yoon Vazquez is an 59 y.o. female with complicated PMH including ADD, GERD, hypothyroidism, HLD, HTN, chronic knee pain who presents to the ED via EMS with multiple family for evaluation of acute on chronic left knee pain.  Patient reports that she has intermittent left knee pain in the past but over the past couple of days it has gotten significantly worse.  She denies any trauma or injury.  She states that yesterday the knee became swollen.  Around midnight she took leftover OxyContin from her previous right arm surgery 4 weeks ago.  Has not helped.   at bedside says that she woke up today and tried to walk to the bathroom and \"became delirious\".  He said she is not usually this emotional or excited about her pain.  They have tried rest, ice, elevation and nothing helping.  Patient does take a statin.  She also reports that a few days ago she had 4 yellowjacket stings to in the left foot, 1 in the right leg and 1 on the upper body.  Denies any tick bites.  Denies any recent trauma.  She denies any fever, chills, chest pain.  She says that the knee was tingling but she denies any loss of sensation.  Has history of pseudogout and she is not sure if she is in a flare.    Medications:  Soc HX:  No Known Allergies: NKDA  Past Medical History:   Diagnosis Date    Abdominal pain 11/30/2023    Abdominal pain, epigastric 11/30/2023    Personal history of other endocrine, nutritional and metabolic disease 09/18/2019    History of hypothyroidism    Personal history of other mental and behavioral disorders 09/18/2019    History of anxiety disorder    Personal history of other mental and behavioral disorders 09/18/2019    History of OCD (obsessive compulsive disorder)    Pneumonia 11/30/2023    Rectal bleeding 11/30/2023    Rib contusion 11/30/2023    Unspecified osteoarthritis, unspecified site 09/18/2019    Osteoarthrosis    UTI (urinary tract infection) " "11/30/2023     Past Surgical History:   Procedure Laterality Date    APPENDECTOMY  09/18/2019    Appendectomy    FOOT SURGERY  09/18/2019    Foot Surgery    KNEE SURGERY  09/18/2019    Knee Surgery     No family history on file.     Physical Exam  Vitals and nursing note reviewed.   Constitutional:       General: She is not in acute distress.     Appearance: Normal appearance. She is not ill-appearing or toxic-appearing.      Comments: Intermittently screaming out and moaning in pain   Eyes:      Pupils: Pupils are equal, round, and reactive to light.   Cardiovascular:      Rate and Rhythm: Normal rate and regular rhythm.      Pulses: Normal pulses.      Heart sounds: Normal heart sounds.   Pulmonary:      Effort: Pulmonary effort is normal.      Breath sounds: Normal breath sounds.   Musculoskeletal:      Comments: LLE: Diffuse tenderness with palpation entire knee and leg.  Slight edema of the lateral aspect of the knee.  Slightly increased warmth compared to right knee.  Active flexion limited to about 30 degrees.  Does not want to attempt passive ROM.  No erythema nor ecchymosis.  Compartments soft.  Symmetric distal pulses.  ROM of ankle reproduces pain in the knee.  Foot slightly inturned which she says is more than normal from her arthritis.   Skin:     General: Skin is warm and dry.   Neurological:      Mental Status: She is alert.      Cranial Nerves: No cranial nerve deficit.     VS: As documented in the triage note and EMR flowsheet from this visit were reviewed.      Medical Decision Making:   ED Course as of 07/11/24 0930   Thu Jul 11, 2024   0703 Vitals Reviewed: Afebrile. Normotensive. Not tachycardic nor tachypneic. No hypoxia.   [KA]   0730 Patient is 59-year-old female presents to the ED for evaluation of acute on chronic left knee pain.  It is difficult to obtain complete history from her because she keeps yelling out and moaning.   does say that she is acting a little bit more \"excited\" " than normal.  Active ROM limited to about 30 degrees of flexion.  Slight warmth compared to contralateral knee.  No erythema, ecchymosis.  Slight lateral edema.  Will obtain x-ray imaging and labs.  DDx includes, but is not limited to, gout/pseudogout, septic arthritis, bursitis, arthritis, anxiety.  Patient to be given 4 mg IV morphine, 4 mg IV Zofran and half tab of p.o. Ativan. [KA]   0901 I personally viewed labs.  CBC without abnormalities.  CMP without abnormalities.  Inflammatory markers normal.  I personally viewed x-ray imaging.  Joint appears arthritic and there appeared to be calcifications in the joint space consistent with pseudogout [KA]   0926 Discussed discussed lab and imaging findings with patient and her spouse.  Pain has improved from a 10/10 to a 3/10.  Do not feel that she is suffering from septic joint.  Given results of imaging and x-ray likely pseudogout flare.  Patient will be initiated on steroids.  Recommend that she wear Ace wrap for compression.  Follow-up with orthopedics.  She said her orthopedist moved.  Will place referral and give patient information for the orthopedic injury clinic.  Recommended returning to ER for any new or worsening symptoms.  Patient and her  agreeable. [KA]      ED Course User Index  [KA] Amanda Abarca PA-C         Diagnoses as of 07/11/24 0930   Pseudogout of left knee      Escalation of Care: Appropriate for outpatient management     Counseling: Spoke with the patient and discussed today´s findings, in addition to providing specific details for the plan of care and expected course.  Patient was given the opportunity to ask questions.    Discussed return precautions and importance of follow-up.  Advised to follow-up with ortho.  Advised to return to the ED for changing or worsening symptoms, new symptoms, complaint specific precautions, and precautions listed on the discharge paperwork.  Educated on the common potential side effects of medications  prescribed.    I advised the patient that the emergency evaluation and treatment provided today doesn't end their need for medical care. It is very important that they follow-up with their primary care provider or other specialist as instructed.    The plan of care was mutually agreed upon with the patient. The patient and/or family were given the opportunity to ask questions. All questions asked today in the ED were answered to the best of my ability with today's information.    I specifically advised the patient to return to the ED for changing or worsening symptoms, worrisome new symptoms, or for any complaint specific precautions listed on the discharge paperwork.    This patient was cared for in the setting of nationwide stress on resources and staffing.    This report was transcribed using voice recognition software.  Every effort was made to ensure accuracy, however, inadvertently computerized transcription errors may be present.       Amanda Abarca PA-C  07/11/24 0931

## 2024-07-11 NOTE — ED PROVIDER NOTES
HPI   No chief complaint on file.      HPI                    No data recorded                   Patient History   Past Medical History:   Diagnosis Date    Abdominal pain 11/30/2023    Abdominal pain, epigastric 11/30/2023    Personal history of other endocrine, nutritional and metabolic disease 09/18/2019    History of hypothyroidism    Personal history of other mental and behavioral disorders 09/18/2019    History of anxiety disorder    Personal history of other mental and behavioral disorders 09/18/2019    History of OCD (obsessive compulsive disorder)    Pneumonia 11/30/2023    Rectal bleeding 11/30/2023    Rib contusion 11/30/2023    Unspecified osteoarthritis, unspecified site 09/18/2019    Osteoarthrosis    UTI (urinary tract infection) 11/30/2023     Past Surgical History:   Procedure Laterality Date    APPENDECTOMY  09/18/2019    Appendectomy    FOOT SURGERY  09/18/2019    Foot Surgery    KNEE SURGERY  09/18/2019    Knee Surgery     No family history on file.  Social History     Tobacco Use    Smoking status: Never    Smokeless tobacco: Never   Substance Use Topics    Alcohol use: Not Currently    Drug use: Never       Physical Exam   ED Triage Vitals   Temp Pulse Resp BP   -- -- -- --      SpO2 Temp src Heart Rate Source Patient Position   -- -- -- --      BP Location FiO2 (%)     -- --       Physical Exam    ED Course & MDM        Medical Decision Making      Procedure  Procedures

## 2024-07-19 ENCOUNTER — APPOINTMENT (OUTPATIENT)
Dept: DERMATOLOGY | Facility: CLINIC | Age: 59
End: 2024-07-19
Payer: COMMERCIAL